# Patient Record
Sex: MALE
[De-identification: names, ages, dates, MRNs, and addresses within clinical notes are randomized per-mention and may not be internally consistent; named-entity substitution may affect disease eponyms.]

---

## 2023-04-18 PROBLEM — Z00.00 ENCOUNTER FOR PREVENTIVE HEALTH EXAMINATION: Status: ACTIVE | Noted: 2023-04-18

## 2023-04-24 ENCOUNTER — APPOINTMENT (OUTPATIENT)
Dept: COLORECTAL SURGERY | Facility: CLINIC | Age: 38
End: 2023-04-24
Payer: COMMERCIAL

## 2023-04-24 ENCOUNTER — NON-APPOINTMENT (OUTPATIENT)
Age: 38
End: 2023-04-24

## 2023-04-24 VITALS
HEIGHT: 71 IN | WEIGHT: 204 LBS | BODY MASS INDEX: 28.56 KG/M2 | HEART RATE: 68 BPM | TEMPERATURE: 98 F | SYSTOLIC BLOOD PRESSURE: 123 MMHG | DIASTOLIC BLOOD PRESSURE: 85 MMHG

## 2023-04-24 DIAGNOSIS — Z78.9 OTHER SPECIFIED HEALTH STATUS: ICD-10-CM

## 2023-04-24 DIAGNOSIS — Z87.19 PERSONAL HISTORY OF OTHER DISEASES OF THE DIGESTIVE SYSTEM: ICD-10-CM

## 2023-04-24 DIAGNOSIS — R10.30 LOWER ABDOMINAL PAIN, UNSPECIFIED: ICD-10-CM

## 2023-04-24 DIAGNOSIS — Z83.79 FAMILY HISTORY OF OTHER DISEASES OF THE DIGESTIVE SYSTEM: ICD-10-CM

## 2023-04-24 DIAGNOSIS — K51.90 ULCERATIVE COLITIS, UNSPECIFIED, W/OUT COMPLICATIONS: ICD-10-CM

## 2023-04-24 DIAGNOSIS — K62.89 OTHER SPECIFIED DISEASES OF ANUS AND RECTUM: ICD-10-CM

## 2023-04-24 PROCEDURE — 99204 OFFICE O/P NEW MOD 45 MIN: CPT

## 2023-04-24 NOTE — PHYSICAL EXAM
[Abdomen Masses] : No abdominal masses [Abdomen Tenderness] : ~T No ~M abdominal tenderness [None] : no anal fissures seen [Excoriation] : no perianal excoriation [Fistula] : no fistulas [Tight] : was tight [de-identified] : Moderate diffuse pain.  Hypertonic sphincter.  Difficult to pass finger. [de-identified] : Ileostomy functional.  Well-healed midline incision.

## 2023-04-24 NOTE — PHYSICAL EXAM
[Abdomen Masses] : No abdominal masses [Abdomen Tenderness] : ~T No ~M abdominal tenderness [None] : no anal fissures seen [Excoriation] : no perianal excoriation [Fistula] : no fistulas [Tight] : was tight [de-identified] : Moderate diffuse pain.  Hypertonic sphincter.  Difficult to pass finger. [de-identified] : Ileostomy functional.  Well-healed midline incision.

## 2023-04-24 NOTE — ASSESSMENT
[FreeTextEntry1] : Recommend enterostomal consultation for issues with acute intermittent pouching issues/leakage.\par \par Reviewed with patient and partner options for treatment including completion proctectomy with permanent end ileostomy versus completion proctectomy and ileal anal J-pouch reconstruction and temporary ileostomy creation.  Advise concerns regarding potential pouch function with hypertonic sphincter–subsequent anal pain, risk of pouch failure, issues with reach/length for pouch construction.  Risk of pouchitis, stool frequency, hernia, obstruction, bleeding, infection, sexual urine dysfunction.\par \par All questions answered.\par \par We will obtain CT scan for further evaluation of clinical rectum, degree of inflammation, and ileal mesentery.\par \par

## 2023-04-24 NOTE — HISTORY OF PRESENT ILLNESS
[FreeTextEntry1] : 37 year old male presents for an evaluation, referred by Dr. Ortega (GI)\par Reason for visit "Questions regarding reversal"\par \par PMH: Ulcerative colitis, h/o ankle fracture, hardware removed\par PSH: sub total colectomy, gynecomasty surgery, ankle surgery\par FH: mother with UC\par \par Last Colonoscopy done on 10/27/2022, by Dr. Ortega, prep was good\par Findings: \par -there was spontaneous oozing of blood from rectal mucosa upon gentle insertion of the scope. \par -Normal anastomosis visualized. \par -Localized mild inflammation characterized by erythema was found in the rectum. Biopsies were taken with a cold forceps for histology.\par -the exam was otherwise without abnormality.\par \par Pathology:\par -rectal mucosa with severely active, chronic proctitis including marked atrophy and idiopathic IBD cannot be excluded.\par \par Pt was advised to repeat flex sigmoidoscopy in 1 year for surveillance and referred to this office.\par \par \par Pt diagnosed w/ UC >20 years ago, had been on various medications and ultimately underwent subtotal colectomy w/ ileostomy 20 years ago in . Denies h/o pouch.\par Reports over the years may have had rectal stump flare, has been prescribed suppositories/enemas in the past but found them painful to insert.\par \par h/o increased rectal bleeding associated w/ lump. Diagnosed w/ rectal fistula s/p repair ~ 4-5 years ago possibly at [facility]. Denies issues w/ recovery\par \par As of 2023 has had more frequent flares associated w/ fecal urgency, BRB and mucus which varies in quantity. Denies abd pain or nausea or vomiting.\par \par BH: 4-5 times daily depending on diet\par Consistency varies depending on diet\par Follows regular diet\par \par Denies use of ASA/NSAIDs for the past week

## 2023-04-24 NOTE — HISTORY OF PRESENT ILLNESS
[FreeTextEntry1] : 37 year old male presents for an evaluation, referred by Dr. Ortega (GI)\par Reason for visit "Questions regarding reversal"\par \par PMH: Ulcerative colitis, h/o ankle fracture, hardware removed\par PSH: sub total colectomy, gynecomasty surgery, ankle surgery\par FH: mother with UC\par \par Last Colonoscopy done on 10/27/2022, by Dr. Ortega, prep was good\par Findings: \par -there was spontaneous oozing of blood from rectal mucosa upon gentle insertion of the scope. \par -Normal anastomosis visualized. \par -Localized mild inflammation characterized by erythema was found in the rectum. Biopsies were taken with a cold forceps for histology.\par -the exam was otherwise without abnormality.\par \par Pathology:\par -rectal mucosa with severely active, chronic proctitis including marked atrophy and idiopathic IBD cannot be excluded.\par \par Pt was advised to repeat flex sigmoidoscopy in 1 year for surveillance and referred to this office.\par \par \par Pt diagnosed w/ UC >20 years ago, had been on various medications and ultimately underwent subtotal colectomy w/ ileostomy 20 years ago in . Denies h/o pouch.\par Reports over the years may have had rectal stump flare, has been prescribed suppositories/enemas in the past but found them painful to insert.\par \par h/o increased rectal bleeding associated w/ lump. Diagnosed w/ rectal fistula s/p repair ~ 4-5 years ago possibly at [facility]. Denies issues w/ recovery\par \par As of 2023 has had more frequent flares associated w/ fecal urgency, BRB and mucus which varies in quantity. Denies abd pain or nausea or vomiting.\par \par BH: 4-5 times daily depending on diet\par Consistency varies depending on diet\par Follows regular diet\par \par Denies use of ASA/NSAIDs for the past week

## 2023-05-01 ENCOUNTER — APPOINTMENT (OUTPATIENT)
Dept: CT IMAGING | Facility: CLINIC | Age: 38
End: 2023-05-01

## 2025-02-07 ENCOUNTER — NON-APPOINTMENT (OUTPATIENT)
Age: 40
End: 2025-02-07